# Patient Record
Sex: MALE | Race: OTHER
[De-identification: names, ages, dates, MRNs, and addresses within clinical notes are randomized per-mention and may not be internally consistent; named-entity substitution may affect disease eponyms.]

---

## 2020-07-29 ENCOUNTER — APPOINTMENT (OUTPATIENT)
Dept: OTOLARYNGOLOGY | Facility: CLINIC | Age: 65
End: 2020-07-29

## 2022-09-13 PROBLEM — Z00.00 ENCOUNTER FOR PREVENTIVE HEALTH EXAMINATION: Status: ACTIVE | Noted: 2022-09-13

## 2023-01-04 ENCOUNTER — NON-APPOINTMENT (OUTPATIENT)
Age: 68
End: 2023-01-04

## 2023-01-04 ENCOUNTER — APPOINTMENT (OUTPATIENT)
Dept: ORTHOPEDIC SURGERY | Facility: CLINIC | Age: 68
End: 2023-01-04
Payer: MEDICARE

## 2023-01-04 VITALS
HEART RATE: 64 BPM | BODY MASS INDEX: 28.77 KG/M2 | WEIGHT: 179 LBS | DIASTOLIC BLOOD PRESSURE: 76 MMHG | SYSTOLIC BLOOD PRESSURE: 115 MMHG | HEIGHT: 66 IN

## 2023-01-04 DIAGNOSIS — M79.642 PAIN IN RIGHT HAND: ICD-10-CM

## 2023-01-04 DIAGNOSIS — M79.641 PAIN IN RIGHT HAND: ICD-10-CM

## 2023-01-04 PROCEDURE — 73130 X-RAY EXAM OF HAND: CPT | Mod: 50

## 2023-01-04 PROCEDURE — 73110 X-RAY EXAM OF WRIST: CPT | Mod: 50

## 2023-01-04 PROCEDURE — 99203 OFFICE O/P NEW LOW 30 MIN: CPT

## 2023-01-04 NOTE — PHYSICAL EXAM
[de-identified] : - Constitutional: This is a male in no obvious distress.  \par - Psych: Patient is alert and oriented to person, place and time.  Patient has a normal mood and affect.\par - Cardiovascular: Normal pulses throughout the upper extremities.  No significant varicosities are noted in the upper extremities. \par - Neuro: Strength and sensation are intact throughout the upper extremities.  Patient has normal coordination.\par - Respiratory:  Patient exhibits no evidence of shortness of breath or difficulty breathing.\par - Skin: No rashes, lesions, or other abnormalities are noted in the upper extremities.\par \par --- \par \par Examination of both hands demonstrates swelling and tenderness along the A1 pulleys of the middle fingers.  There is triggering of both digits.  There is no obvious triggering of the other digits.  He has intact sensation to light touch bilaterally along the radial, ulnar and median nerve distributions.  Provocative signs for carpal tunnel syndrome are positive on the left side. [de-identified] : PA, lateral, and oblique radiographs of the bilateral wrists and hands demonstrate minimal degenerative changes at the DIP joints of the digits and at the CMC joints of the thumbs.

## 2023-01-04 NOTE — ADDENDUM
[FreeTextEntry1] : I, Elizabeth Almonte, acted solely as a scribe for Dr. Joy on this date on 01/04/2023.

## 2023-01-04 NOTE — END OF VISIT
[FreeTextEntry3] : This note was written by Elizabeth Almonte on 01/04/2023 acting solely as a scribe for Dr. Brian Joy.\par  \par All medical record entries made by the Scribe were at my, Dr. Brian Joy, direction and personally dictated by me on 01/04/2023. I have personally reviewed the chart and agree that the record accurately reflects my personal performance of the history, physical exam, assessment and plan.

## 2023-01-04 NOTE — DISCUSSION/SUMMARY
[FreeTextEntry1] : He has findings consistent with left carpal tunnel syndrome and bilateral middle finger trigger fingers.\par \par I had a discussion with the patient regarding today's visit, the prognosis of this diagnosis, and treatment recommendations and options. With regard to the left hand numbness and tingling, I recommended an EMG to evaluate for left carpal tunnel syndrome. I also recommended bracing, he was fitted with a left carpal tunnel splint in the office today. He will follow up after his EMG to review the results and discuss treatment recommendations.\par \par With regard to the bilateral middle fingers, I did tell him that he developed trigger fingers. I would first recommend he undergo the EMG for the left hand for further evaluation, prior to making definitive treatment recommendations with regard to the middle fingers.\par \par He has agreed to the above plan of management and has expressed full understanding.  All questions were fully answered to their satisfaction. \par \par My cumulative time spent on this visit included: Preparation for the visit, review of the medical records, review of pertinent diagnostic studies, examination and counseling of the patient on the above diagnosis, treatment plan and prognosis, orders of diagnostic tests, medication and/or appropriate procedures and documentation in the medical records of today's visit.

## 2023-01-04 NOTE — HISTORY OF PRESENT ILLNESS
[Right] : right hand dominant [FreeTextEntry1] : He comes in today for evaluation of bilateral hand pain which began 3 months ago. He notes the left hand is more symptomatic than the right. He additionally complains of numbness and tingling most notably at the left hand. He notes the hand "falls asleep", typically when the elbow is in a flexed position. He has a difficult time extending the digits of the left hand due to stiffness. He also notes pain and tenderness bilaterally at the middle fingers. He has not had prior treatment for his hands.

## 2023-01-23 ENCOUNTER — APPOINTMENT (OUTPATIENT)
Dept: NEUROLOGY | Facility: CLINIC | Age: 68
End: 2023-01-23

## 2023-02-08 ENCOUNTER — APPOINTMENT (OUTPATIENT)
Dept: ORTHOPEDIC SURGERY | Facility: CLINIC | Age: 68
End: 2023-02-08
Payer: MEDICARE

## 2023-02-08 PROCEDURE — 99214 OFFICE O/P EST MOD 30 MIN: CPT

## 2023-02-08 NOTE — DISCUSSION/SUMMARY
[FreeTextEntry1] : I reviewed the EMG results with him.  I had a discussion regarding today's visit, the diagnosis and treatment recommendations and options.  We also discussed changes since the last visit.\par \par With regard to the bilateral middle fingers, he deferred cortisone injections as he stated his symptoms are mild today. He will follow up as needed, according to his symptoms.\par \par With regard to the left greater than right carpal tunnel syndrome, we discussed treatment recommendations.  With regard to the left side, we discussed surgical release which she deferred at this time.  He would like to give this further time, if his symptoms are worsening, then he will return to the office to discuss further treatment recommendations.\par \par The patient has agreed to the above plan of management and has expressed full understanding.  All questions were fully answered to the patient's satisfaction.\par \par My cumulative time spent on today's visit was greater than 30 minutes and included: Preparation for the visit, review of the medical records, review of pertinent diagnostic studies, examination and counseling of the patient on the above diagnosis, treatment plan and prognosis, orders of diagnostic tests, medications and/or appropriate procedures and documentation in the medical records of today's visit.

## 2023-02-08 NOTE — HISTORY OF PRESENT ILLNESS
[FreeTextEntry1] : Follow-up regarding left carpal tunnel syndrome bilateral middle finger trigger fingers.\par \par See note from when he was seen in the office 5 weeks ago.  He was referred for EMGs.  He comes in to review results and discuss treatment recommendations.\par \par He returns today and reports continued symptoms at the bilateral middle fingers. He additionally complains of numbness and tingling to the hands bilaterally. He has worn braces at night without relief. \par \par I reviewed EMGs dated 01/30/2023. These demonstrated evidence of mild right and moderate left median nerve neuropathy at the wrist, consistent with carpal tunnel syndrome.

## 2023-02-08 NOTE — PHYSICAL EXAM
[de-identified] : - Constitutional: This is a male in no obvious distress.  \par - Psych: Patient is alert and oriented to person, place and time.  Patient has a normal mood and affect.\par - Cardiovascular: Normal pulses throughout the upper extremities.  No significant varicosities are noted in the upper extremities. \par - Neuro: Strength and sensation are intact throughout the upper extremities.  Patient has normal coordination.\par - Respiratory:  Patient exhibits no evidence of shortness of breath or difficulty breathing.\par - Skin: No rashes, lesions, or other abnormalities are noted in the upper extremities.\par \par --- \par \par Examination of both hands demonstrates swelling and tenderness along the A1 pulleys of the middle fingers.  There is triggering of both digits.  There is no obvious triggering of the other digits.  He has intact sensation to light touch bilaterally along the radial, ulnar and median nerve distributions.  Provocative signs for carpal tunnel syndrome are positive on the left side. [de-identified] : Recent PA, lateral, and oblique radiographs of the bilateral wrists and hands demonstrated minimal degenerative changes at the DIP joints of the digits and at the CMC joints of the thumbs.

## 2023-02-08 NOTE — END OF VISIT
[FreeTextEntry3] : This note was written by Elizabeth Almonte on 02/08/2023 acting solely as a scribe for Dr. Brian Joy.\par  \par All medical record entries made by the Scribe were at my, Dr. Brian Joy, direction and personally dictated by me on 02/08/2023. I have personally reviewed the chart and agree that the record accurately reflects my personal performance of the history, physical exam, assessment and plan.

## 2023-02-08 NOTE — ADDENDUM
[FreeTextEntry1] : I, Elizabeth Almonte, acted solely as a scribe for Dr. Joy on this date on 02/08/2023.

## 2023-04-28 ENCOUNTER — APPOINTMENT (OUTPATIENT)
Dept: ORTHOPEDIC SURGERY | Facility: CLINIC | Age: 68
End: 2023-04-28
Payer: MEDICARE

## 2023-04-28 PROCEDURE — 99214 OFFICE O/P EST MOD 30 MIN: CPT

## 2023-04-28 NOTE — END OF VISIT
[FreeTextEntry3] : This note was written by Elizabeth Almonte on 04/28/2023 acting solely as a scribe for Dr. Brian Joy.\par  \par All medical record entries made by the Scribe were at my, Dr. Brian Joy, direction and personally dictated by me on 04/28/2023. I have personally reviewed the chart and agree that the record accurately reflects my personal performance of the history, physical exam, assessment and plan.

## 2023-04-28 NOTE — PHYSICAL EXAM
[de-identified] : - Constitutional: This is a male in no obvious distress.  \par - Psych: Patient is alert and oriented to person, place and time.  Patient has a normal mood and affect.\par - Cardiovascular: Normal pulses throughout the upper extremities.  No significant varicosities are noted in the upper extremities. \par - Neuro: Strength and sensation are intact throughout the upper extremities.  Patient has normal coordination.\par - Respiratory:  Patient exhibits no evidence of shortness of breath or difficulty breathing.\par - Skin: No rashes, lesions, or other abnormalities are noted in the upper extremities.\par \par --- \par \par Examination of his left hand demonstrates tenderness along the A1 pulleys of the index and middle fingers.  There is mild triggering.  There is no triggering of the other digits.  He has complaints of numbness and tingling along the median nerve distribution with normal sensation to light touch along the radial and ulnar nerve distributions.  Provocative signs for carpal tunnel syndrome are positive.\par \par Lamination of his right hand demonstrates no obvious swelling or tenderness along the A1 pulley of the middle finger.  There is minimal triggering.  Provocative signs for carpal tunnel syndrome are negative. [de-identified] : Recent PA, lateral, and oblique radiographs of the bilateral wrists and hands demonstrated minimal degenerative changes at the DIP joints of the digits and at the CMC joints of the thumbs.

## 2023-04-28 NOTE — ADDENDUM
[FreeTextEntry1] : I, Elizabeth Almonte, acted solely as a scribe for Dr. Joy on this date on 04/28/2023.

## 2023-04-28 NOTE — HISTORY OF PRESENT ILLNESS
[FreeTextEntry1] : Follow-up regarding left carpal tunnel syndrome and bilateral middle finger trigger fingers.\par \par See note from when he was last seen in the office greater than 2 1/2 months ago.  He deferred injections.\par \par He returns today and notes triggering at the left index and middle fingers. He notes pain and locking of both digits. He additionally complains of numbness to the left hand. He experiencing throbbing pain and numbness to the left hand, worse at night. He notes when the hand goes numb, the left index and middle trigger fingers are exacerbated. He additionally notes continued pain at the right middle finger, although it is overall improved and is not as severe as the trigger fingers to the left hand. \par \par EMGs dated 01/30/2023 demonstrated evidence of mild right and moderate left median nerve neuropathy at the wrist, consistent with carpal tunnel syndrome.

## 2023-04-28 NOTE — DISCUSSION/SUMMARY
[FreeTextEntry1] : I had a discussion regarding today's visit, the diagnosis and treatment recommendations and options.  We also discussed changes since the last visit. With regard to the right middle finger trigger finger, as his symptoms are mild,t, I recommended observation. He will follow up as needed in this regard.\par \par With regard to his left carpal tunnel syndrome as well as his left index and middle finger trigger fingers, he deferred cortisone injection.  He would like to schedule surgery.  He will be scheduled for an endoscopic left carpal tunnel release and a left index and middle finger trigger release.  He will speak with our surgical scheduler and be scheduled for surgery in the near future, when it is convenient for him.\par \par -  The nature and purposes of endoscopic carpal tunnel release, in addition to left index and middle finger trigger releases, was discussed in detail with the patient. We discussed the surgical procedure in detail, as well as expected postoperative recovery and outcome.\par -  Possible risks, benefits and complications (from known and unknown causes) of the procedure were discussed in detail.  \par -  Possible non-operative alternatives to the proposed treatment were discussed in detail.  \par -  I discussed with the patient that I will be performing an endoscopic carpal tunnel release, in addition to left index and middle finger trigger releases. We discussed both open and endoscopic carpal tunnel release, and the associated risks and benefits of each of these procedures. The patient understands that it is possible that the endoscopic carpal tunnel release may need to be converted to an open release during the procedure, if the carpal tunnel cannot be well visualized or safely released endoscopically. In addition, the patient does understand that, based upon some of the hand surgery literature, there is a slightly higher risk of iatrogenic nerve or vessel injury associated with endoscopic carpal tunnel release, as compared to open carpal tunnel release.\par -  He was also told that other possible risks/complications include, but are not limited to:  Infection, nerve or vessel injury, stiffness, painful scar, poor outcome, need for additional surgical procedures, and other unforeseen complications.  \par -  In addition, the possibility of an "unsuccessful outcome," despite "successful surgery," was discussed with the patient.  The patient understands that nerve recovery after surgical release can be unpredictable, and there are no ""guarantees"" that the preoperative symptoms will completely resolve.\par -  The patient fully understands these risks and wishes to proceed.  \par -  I had a lengthy discussion with the patient regarding today's visit, the diagnosis, and my surgical treatment recommendations.  The patient has agreed to this plan of management and has expressed full understanding.  All questions were fully answered to the patient's satisfaction. 	 \par \par My cumulative time spent on today's visit was greater than 30 minutes and included: Preparation for the visit, review of the medical records, review of pertinent diagnostic studies, examination and counseling of the patient on the above diagnosis, treatment plan and prognosis, orders of diagnostic tests, medications and/or appropriate procedures and documentation in the medical records of today's visit.

## 2023-05-17 ENCOUNTER — OUTPATIENT (OUTPATIENT)
Dept: OUTPATIENT SERVICES | Facility: HOSPITAL | Age: 68
LOS: 1 days | End: 2023-05-17
Payer: MEDICARE

## 2023-05-17 VITALS
RESPIRATION RATE: 14 BRPM | SYSTOLIC BLOOD PRESSURE: 117 MMHG | DIASTOLIC BLOOD PRESSURE: 71 MMHG | OXYGEN SATURATION: 96 % | TEMPERATURE: 97 F | WEIGHT: 182.98 LBS | HEART RATE: 65 BPM | HEIGHT: 66 IN

## 2023-05-17 DIAGNOSIS — G56.02 CARPAL TUNNEL SYNDROME, LEFT UPPER LIMB: ICD-10-CM

## 2023-05-17 DIAGNOSIS — M65.322 TRIGGER FINGER, LEFT INDEX FINGER: ICD-10-CM

## 2023-05-17 DIAGNOSIS — Z01.818 ENCOUNTER FOR OTHER PREPROCEDURAL EXAMINATION: ICD-10-CM

## 2023-05-17 DIAGNOSIS — Z98.890 OTHER SPECIFIED POSTPROCEDURAL STATES: Chronic | ICD-10-CM

## 2023-05-17 DIAGNOSIS — M65.332 TRIGGER FINGER, LEFT MIDDLE FINGER: ICD-10-CM

## 2023-05-17 LAB
ALBUMIN SERPL ELPH-MCNC: 3.9 G/DL — SIGNIFICANT CHANGE UP (ref 3.3–5)
ALP SERPL-CCNC: 72 U/L — SIGNIFICANT CHANGE UP (ref 30–120)
ALT FLD-CCNC: 34 U/L DA — SIGNIFICANT CHANGE UP (ref 10–60)
ANION GAP SERPL CALC-SCNC: 9 MMOL/L — SIGNIFICANT CHANGE UP (ref 5–17)
AST SERPL-CCNC: 20 U/L — SIGNIFICANT CHANGE UP (ref 10–40)
BILIRUB SERPL-MCNC: 0.4 MG/DL — SIGNIFICANT CHANGE UP (ref 0.2–1.2)
BUN SERPL-MCNC: 25 MG/DL — HIGH (ref 7–23)
CALCIUM SERPL-MCNC: 9.7 MG/DL — SIGNIFICANT CHANGE UP (ref 8.4–10.5)
CHLORIDE SERPL-SCNC: 104 MMOL/L — SIGNIFICANT CHANGE UP (ref 96–108)
CO2 SERPL-SCNC: 28 MMOL/L — SIGNIFICANT CHANGE UP (ref 22–31)
CREAT SERPL-MCNC: 1.1 MG/DL — SIGNIFICANT CHANGE UP (ref 0.5–1.3)
EGFR: 73 ML/MIN/1.73M2 — SIGNIFICANT CHANGE UP
GLUCOSE SERPL-MCNC: 96 MG/DL — SIGNIFICANT CHANGE UP (ref 70–99)
HCT VFR BLD CALC: 41.4 % — SIGNIFICANT CHANGE UP (ref 39–50)
HGB BLD-MCNC: 14.1 G/DL — SIGNIFICANT CHANGE UP (ref 13–17)
MCHC RBC-ENTMCNC: 28.8 PG — SIGNIFICANT CHANGE UP (ref 27–34)
MCHC RBC-ENTMCNC: 34.1 GM/DL — SIGNIFICANT CHANGE UP (ref 32–36)
MCV RBC AUTO: 84.5 FL — SIGNIFICANT CHANGE UP (ref 80–100)
NRBC # BLD: 0 /100 WBCS — SIGNIFICANT CHANGE UP (ref 0–0)
PLATELET # BLD AUTO: 177 K/UL — SIGNIFICANT CHANGE UP (ref 150–400)
POTASSIUM SERPL-MCNC: 4.3 MMOL/L — SIGNIFICANT CHANGE UP (ref 3.5–5.3)
POTASSIUM SERPL-SCNC: 4.3 MMOL/L — SIGNIFICANT CHANGE UP (ref 3.5–5.3)
PROT SERPL-MCNC: 7.4 G/DL — SIGNIFICANT CHANGE UP (ref 6–8.3)
RBC # BLD: 4.9 M/UL — SIGNIFICANT CHANGE UP (ref 4.2–5.8)
RBC # FLD: 13.2 % — SIGNIFICANT CHANGE UP (ref 10.3–14.5)
SODIUM SERPL-SCNC: 141 MMOL/L — SIGNIFICANT CHANGE UP (ref 135–145)
WBC # BLD: 6.25 K/UL — SIGNIFICANT CHANGE UP (ref 3.8–10.5)
WBC # FLD AUTO: 6.25 K/UL — SIGNIFICANT CHANGE UP (ref 3.8–10.5)

## 2023-05-17 PROCEDURE — 36415 COLL VENOUS BLD VENIPUNCTURE: CPT

## 2023-05-17 PROCEDURE — 85027 COMPLETE CBC AUTOMATED: CPT

## 2023-05-17 PROCEDURE — 93010 ELECTROCARDIOGRAM REPORT: CPT

## 2023-05-17 PROCEDURE — 80053 COMPREHEN METABOLIC PANEL: CPT

## 2023-05-17 PROCEDURE — G0463: CPT

## 2023-05-17 PROCEDURE — 93005 ELECTROCARDIOGRAM TRACING: CPT

## 2023-05-17 NOTE — H&P PST ADULT - HISTORY OF PRESENT ILLNESS
this is a 68 y/o male who has had numbness tingling, pain left hand for yrs; to have carpal tunnel release left hand

## 2023-05-17 NOTE — H&P PST ADULT - NSANTHOSAYNRD_GEN_A_CORE
left ankle injury while playing basketball No. RAH screening performed.  STOP BANG Legend: 0-2 = LOW Risk; 3-4 = INTERMEDIATE Risk; 5-8 = HIGH Risk

## 2023-05-17 NOTE — H&P PST ADULT - PROBLEM SELECTOR PLAN 1
endoscopic left carpal tunnel release, possible open, left index finger trigger release, left middle finger trigger release, preop instructions given, to go for medical clearance

## 2023-05-26 ENCOUNTER — NON-APPOINTMENT (OUTPATIENT)
Age: 68
End: 2023-05-26

## 2023-06-05 ENCOUNTER — TRANSCRIPTION ENCOUNTER (OUTPATIENT)
Age: 68
End: 2023-06-05

## 2023-06-06 ENCOUNTER — APPOINTMENT (OUTPATIENT)
Dept: ORTHOPEDIC SURGERY | Facility: HOSPITAL | Age: 68
End: 2023-06-06

## 2023-06-06 ENCOUNTER — TRANSCRIPTION ENCOUNTER (OUTPATIENT)
Age: 68
End: 2023-06-06

## 2023-06-06 ENCOUNTER — OUTPATIENT (OUTPATIENT)
Dept: OUTPATIENT SERVICES | Facility: HOSPITAL | Age: 68
LOS: 1 days | End: 2023-06-06
Payer: MEDICARE

## 2023-06-06 VITALS
HEART RATE: 66 BPM | DIASTOLIC BLOOD PRESSURE: 73 MMHG | WEIGHT: 176.59 LBS | RESPIRATION RATE: 14 BRPM | SYSTOLIC BLOOD PRESSURE: 117 MMHG | OXYGEN SATURATION: 100 % | HEIGHT: 66 IN | TEMPERATURE: 98 F

## 2023-06-06 VITALS
SYSTOLIC BLOOD PRESSURE: 111 MMHG | OXYGEN SATURATION: 97 % | RESPIRATION RATE: 15 BRPM | DIASTOLIC BLOOD PRESSURE: 59 MMHG | HEART RATE: 66 BPM | TEMPERATURE: 98 F

## 2023-06-06 DIAGNOSIS — M65.322 TRIGGER FINGER, LEFT INDEX FINGER: ICD-10-CM

## 2023-06-06 DIAGNOSIS — Z98.890 OTHER SPECIFIED POSTPROCEDURAL STATES: Chronic | ICD-10-CM

## 2023-06-06 DIAGNOSIS — M65.332 TRIGGER FINGER, LEFT MIDDLE FINGER: ICD-10-CM

## 2023-06-06 DIAGNOSIS — G56.02 CARPAL TUNNEL SYNDROME, LEFT UPPER LIMB: ICD-10-CM

## 2023-06-06 PROCEDURE — 29848 WRIST ENDOSCOPY/SURGERY: CPT | Mod: LT

## 2023-06-06 PROCEDURE — 26055 INCISE FINGER TENDON SHEATH: CPT | Mod: F1

## 2023-06-06 PROCEDURE — 26055 INCISE FINGER TENDON SHEATH: CPT | Mod: F2

## 2023-06-06 RX ORDER — IBUPROFEN 200 MG
1 TABLET ORAL
Qty: 10 | Refills: 0
Start: 2023-06-06

## 2023-06-06 RX ORDER — OMEPRAZOLE 10 MG/1
1 CAPSULE, DELAYED RELEASE ORAL
Refills: 0 | DISCHARGE

## 2023-06-06 RX ORDER — SODIUM CHLORIDE 9 MG/ML
1000 INJECTION, SOLUTION INTRAVENOUS
Refills: 0 | Status: DISCONTINUED | OUTPATIENT
Start: 2023-06-06 | End: 2023-06-07

## 2023-06-06 RX ORDER — ONDANSETRON 8 MG/1
4 TABLET, FILM COATED ORAL ONCE
Refills: 0 | Status: DISCONTINUED | OUTPATIENT
Start: 2023-06-06 | End: 2023-06-07

## 2023-06-06 RX ORDER — OMEPRAZOLE 10 MG/1
20 CAPSULE, DELAYED RELEASE ORAL
Refills: 0 | DISCHARGE

## 2023-06-06 RX ORDER — CHLORHEXIDINE GLUCONATE 213 G/1000ML
1 SOLUTION TOPICAL ONCE
Refills: 0 | Status: COMPLETED | OUTPATIENT
Start: 2023-06-06 | End: 2023-06-06

## 2023-06-06 RX ORDER — ACETAMINOPHEN 500 MG
1000 TABLET ORAL ONCE
Refills: 0 | Status: COMPLETED | OUTPATIENT
Start: 2023-06-06 | End: 2023-06-06

## 2023-06-06 RX ORDER — HYDROMORPHONE HYDROCHLORIDE 2 MG/ML
0.5 INJECTION INTRAMUSCULAR; INTRAVENOUS; SUBCUTANEOUS
Refills: 0 | Status: DISCONTINUED | OUTPATIENT
Start: 2023-06-06 | End: 2023-06-07

## 2023-06-06 RX ORDER — PITAVASTATIN CALCIUM 1.04 MG/1
1 TABLET, FILM COATED ORAL
Refills: 0 | DISCHARGE

## 2023-06-06 RX ORDER — OXYCODONE HYDROCHLORIDE 5 MG/1
5 TABLET ORAL ONCE
Refills: 0 | Status: DISCONTINUED | OUTPATIENT
Start: 2023-06-06 | End: 2023-06-07

## 2023-06-06 RX ORDER — CEFAZOLIN SODIUM 1 G
2000 VIAL (EA) INJECTION ONCE
Refills: 0 | Status: COMPLETED | OUTPATIENT
Start: 2023-06-06 | End: 2023-06-06

## 2023-06-06 RX ADMIN — SODIUM CHLORIDE 75 MILLILITER(S): 9 INJECTION, SOLUTION INTRAVENOUS at 13:17

## 2023-06-06 RX ADMIN — CHLORHEXIDINE GLUCONATE 1 APPLICATION(S): 213 SOLUTION TOPICAL at 11:42

## 2023-06-06 NOTE — ASU DISCHARGE PLAN (ADULT/PEDIATRIC) - CARE PROVIDER_API CALL
Brian Joy)  Orthopaedic Surgery; Surgery of the Hand  [address], [address]  [place] [address]  Phone: [phone]  Fax: [phone]  Follow Up Time:

## 2023-06-06 NOTE — BRIEF OPERATIVE NOTE - NSICDXBRIEFPROCEDURE_GEN_ALL_CORE_FT
PROCEDURES:  Endoscopic carpal tunnel release 06-Jun-2023 11:54:39  Jose An  Trigger finger release 06-Jun-2023 11:54:50  Jose An

## 2023-06-06 NOTE — ASU PATIENT PROFILE, ADULT - FALL HARM RISK - UNIVERSAL INTERVENTIONS
Bed in lowest position, wheels locked, appropriate side rails in place/Call bell, personal items and telephone in reach/Instruct patient to call for assistance before getting out of bed or chair/Non-slip footwear when patient is out of bed/[place] to call system/Physically safe environment - no spills, clutter or unnecessary equipment/Purposeful Proactive Rounding/Room/bathroom lighting operational, light cord in reach

## 2023-06-06 NOTE — ASU DISCHARGE PLAN (ADULT/PEDIATRIC) - ASU DC SPECIAL INSTRUCTIONSFT
DO NOT wet or remove the dressing.  Elevate the extremity to reduce swelling.  No strenuous activities or heavy lifting.    Please follow Dr. Joy's instructions.    Follow up in the office on 6/16/23.  Please call to confirm the appointment.

## 2023-06-06 NOTE — ASU DISCHARGE PLAN (ADULT/PEDIATRIC) - NS MD DC FALL RISK RISK
For information on Fall & Injury Prevention, visit: [email or web address] OR  [email or web address] OR  https://www.cdc.gov/steadi/patient.html

## 2023-06-06 NOTE — BRIEF OPERATIVE NOTE - NSICDXBRIEFPOSTOP_GEN_ALL_CORE_FT
POST-OP DIAGNOSIS:  Trigger finger, left middle finger 06-Jun-2023 11:56:09  Jose An  Trigger finger, left index finger 06-Jun-2023 11:56:04  Jose An  Left carpal tunnel syndrome 06-Jun-2023 11:55:57  Jose An

## 2023-06-06 NOTE — BRIEF OPERATIVE NOTE - NSICDXBRIEFPREOP_GEN_ALL_CORE_FT
PRE-OP DIAGNOSIS:  Left carpal tunnel syndrome 06-Jun-2023 11:55:11  Jose An  Trigger finger, left index finger 06-Jun-2023 11:55:31  Jose An  Trigger finger, left middle finger 06-Jun-2023 11:55:43  Jose An

## 2023-06-06 NOTE — ASU DISCHARGE PLAN (ADULT/PEDIATRIC) - PROCEDURE
Left endoscopic carpal tunnel release, left index finger trigger release and left middle finger trigger release.

## 2023-06-07 PROBLEM — E78.5 HYPERLIPIDEMIA, UNSPECIFIED: Chronic | Status: ACTIVE | Noted: 2023-05-17

## 2023-06-14 ENCOUNTER — APPOINTMENT (OUTPATIENT)
Dept: ORTHOPEDIC SURGERY | Facility: CLINIC | Age: 68
End: 2023-06-14
Payer: MEDICARE

## 2023-06-14 PROBLEM — N20.0 CALCULUS OF KIDNEY: Chronic | Status: ACTIVE | Noted: 2023-05-17

## 2023-06-14 PROBLEM — Z86.69 PERSONAL HISTORY OF OTHER DISEASES OF THE NERVOUS SYSTEM AND SENSE ORGANS: Chronic | Status: ACTIVE | Noted: 2023-05-17

## 2023-06-14 PROCEDURE — 99024 POSTOP FOLLOW-UP VISIT: CPT

## 2023-06-14 RX ORDER — CEPHALEXIN 500 MG/1
500 TABLET ORAL
Qty: 8 | Refills: 0 | Status: ACTIVE | COMMUNITY
Start: 2023-06-14 | End: 1900-01-01

## 2023-06-14 NOTE — END OF VISIT
[FreeTextEntry3] : This note was written by Elizabeth Almonte on 06/14/2023 acting solely as a scribe for Dr. Brian Joy.\par  \par All medical record entries made by the Scribe were at my, Dr. Brian Joy, direction and personally dictated by me on 06/14/2023. I have personally reviewed the chart and agree that the record accurately reflects my personal performance of the history, physical exam, assessment and plan.

## 2023-06-14 NOTE — ADDENDUM
[FreeTextEntry1] : I, Elizabeth Almonte, acted solely as a scribe for Dr. Joy on this date on 06/14/2023.

## 2023-06-14 NOTE — HISTORY OF PRESENT ILLNESS
[de-identified] : 8 days postoperative. [de-identified] : 8 days status post endoscopic left carpal tunnel release and left index and middle finger trigger releases.  Date of surgery: 6/6/2023.\par \par He doing okay but notes residual numbness as well as pain postoperatively. He also complains of weakness to the hand. He rates his pain as an 8 out of 10 at this time.  [de-identified] : Examination of his left wrist and hand demonstrates his release incisions to be healing well.  There is slight erythema without drainage at the carpal tunnel release incision.  He has appropriate flexion and extension of the digits without residual triggering.  Is intact sensation to light touch distally along the radial, ulnar and median nerve distributions. [de-identified] : Stable, 8 days postoperative, with slight erythema along the carpal tunnel release incision. [de-identified] : He was told that the sutures will fall out on their own.  He was instructed on local wound care, when to begin scar massage and desensitization, range of motion exercises. Finally, given the mild redness surrounding the incision sites, I recommended he begin a 2-day course of Keflex 500 mg, QID with meals.  He will followup in 2 weeks.  However, if he notices increased pain, swelling redness or signs of infection at the carpal tunnel incision, then he will return to the office before then.

## 2023-06-16 ENCOUNTER — NON-APPOINTMENT (OUTPATIENT)
Age: 68
End: 2023-06-16

## 2023-06-19 ENCOUNTER — NON-APPOINTMENT (OUTPATIENT)
Age: 68
End: 2023-06-19

## 2023-06-19 DIAGNOSIS — Z98.890 OTHER SPECIFIED POSTPROCEDURAL STATES: ICD-10-CM

## 2023-06-19 RX ORDER — CEPHALEXIN 500 MG/1
500 CAPSULE ORAL EVERY 8 HOURS
Qty: 15 | Refills: 0 | Status: ACTIVE | COMMUNITY
Start: 2023-06-19 | End: 1900-01-01

## 2023-06-21 ENCOUNTER — APPOINTMENT (OUTPATIENT)
Dept: ORTHOPEDIC SURGERY | Facility: CLINIC | Age: 68
End: 2023-06-21
Payer: MEDICARE

## 2023-06-21 PROCEDURE — 99024 POSTOP FOLLOW-UP VISIT: CPT

## 2023-06-21 NOTE — HISTORY OF PRESENT ILLNESS
[de-identified] : 15 days postoperative. [de-identified] : 15 days status post endoscopic left carpal tunnel release and left index and middle finger trigger releases.  Date of surgery: 6/6/2023.\par \par He called the office 2 days ago as he had some redness and drainage at the middle finger incision.  He was started on Keflex 500 mg, TID. He notes evidence of mild drainage such as pus from the index finger incision site however today there is no drainage. He complains of continued pain and stiffness to the digits but notes improvements in his preoperative symptoms of numbness and tingling. He rates his pain as a 6 out of 10 at this time.  [de-identified] : Examination of his left wrist and hand demonstrates his release incisions to be healing well.  There is no obvious erythema, pus or signs of an active infection.  There is residual swelling, most notably at the index finger incision.  He has improved flexion and extension of the digits without residual triggering.  Is intact sensation to light touch distally along the radial, ulnar and median nerve distributions. [de-identified] : 15 days postoperative, with evidence of a resolved superficial infection at his right middle finger trigger release incision. [de-identified] : At this time, he will continue with the course of Keflex 500 mg, TID, as prescribed through Friday. He was instructed on continued range of motion exercises and when to begin scar massage and desensitization.  He will follow-up in 4 weeks.  He was instructed to return sooner if he notices any increased signs of symptoms of infection such as redness, drainage, fever or chills.

## 2023-06-21 NOTE — ADDENDUM
[FreeTextEntry1] : I, Elizabeth Almonte, acted solely as a scribe for Dr. Joy on this date on 06/21/2023.

## 2023-06-21 NOTE — END OF VISIT
[FreeTextEntry3] : This note was written by Elizabeth Almonte on 06/21/2023 acting solely as a scribe for Dr. Brian Joy.\par  \par All medical record entries made by the Scribe were at my, Dr. Brian Joy, direction and personally dictated by me on 06/21/2023. I have personally reviewed the chart and agree that the record accurately reflects my personal performance of the history, physical exam, assessment and plan.

## 2023-07-12 ENCOUNTER — NON-APPOINTMENT (OUTPATIENT)
Age: 68
End: 2023-07-12

## 2023-07-14 ENCOUNTER — APPOINTMENT (OUTPATIENT)
Dept: ORTHOPEDIC SURGERY | Facility: CLINIC | Age: 68
End: 2023-07-14
Payer: MEDICARE

## 2023-07-14 ENCOUNTER — NON-APPOINTMENT (OUTPATIENT)
Age: 68
End: 2023-07-14

## 2023-07-14 PROCEDURE — 99024 POSTOP FOLLOW-UP VISIT: CPT

## 2023-07-14 RX ORDER — MELOXICAM 15 MG/1
15 TABLET ORAL DAILY
Qty: 30 | Refills: 1 | Status: ACTIVE | COMMUNITY
Start: 2023-07-14 | End: 1900-01-01

## 2023-07-14 NOTE — ADDENDUM
[FreeTextEntry1] : I, Elizabeth Almonte, acted solely as a scribe for Dr. Joy on this date on 07/14/2023.

## 2023-07-14 NOTE — END OF VISIT
[FreeTextEntry3] : This note was written by Elizabeth Almonte on 07/14/2023 acting solely as a scribe for Dr. Brian Joy.\par  \par All medical record entries made by the Scribe were at my, Dr. Brian Joy, direction and personally dictated by me on 07/14/2023. I have personally reviewed the chart and agree that the record accurately reflects my personal performance of the history, physical exam, assessment and plan.

## 2023-07-14 NOTE — HISTORY OF PRESENT ILLNESS
[de-identified] : 38 days postoperative. [de-identified] : 38 days status post endoscopic left carpal tunnel release and left index and middle finger trigger releases.  Date of surgery: 6/6/2023.\par \par He was last seen in the office 23 days ago and he had evidence of a resolved superficial infection at the right middle finger trigger release incision.\par \par He returns today and notes increased pain as well as swelling. He has difficulty making a tight composite fist. He also notes residual tingling at the left index finger, however this is somewhat improved as compared to preoperatively.  [de-identified] : Examination of his left wrist and hand demonstrates his release incisions to be healing well.  There is no erythema or signs of an infection.  There is some residual swelling most notably at the middle finger incision.  He has some limitation of terminal flexion and extension, more notably at the middle finger with no residual triggering.  He has intact sensation to light touch throughout the fingers although he does have some complaints of some residual numbness which she states is improved compared to preoperative. [de-identified] : 38 days postoperative, with residual swelling and stiffness and pain. [de-identified] : At this time, I did tell him that I see no signs of an infection.  At this time I recommended he begin a course of Mobic 15 mg, once daily with meals for the next 2 weeks. I warned of potential GI side effects. Finally, I recommended he begin a course of hand therapy and he was provided with the appropriate referral. He will follow up in 4 weeks. If at that time, he has continued complaints of pain and swelling, I may possibly recommend a cortisone injection in the region of the left middle finger incision site to break up to the scar tissue.

## 2023-07-26 ENCOUNTER — APPOINTMENT (OUTPATIENT)
Dept: ORTHOPEDIC SURGERY | Facility: CLINIC | Age: 68
End: 2023-07-26

## 2023-08-03 ENCOUNTER — NON-APPOINTMENT (OUTPATIENT)
Age: 68
End: 2023-08-03

## 2023-08-11 ENCOUNTER — APPOINTMENT (OUTPATIENT)
Dept: ORTHOPEDIC SURGERY | Facility: CLINIC | Age: 68
End: 2023-08-11
Payer: MEDICARE

## 2023-08-11 PROCEDURE — 99024 POSTOP FOLLOW-UP VISIT: CPT

## 2023-08-11 RX ORDER — METHYLPREDNISOLONE 4 MG/1
4 TABLET ORAL
Qty: 21 | Refills: 0 | Status: ACTIVE | COMMUNITY
Start: 2023-08-11 | End: 1900-01-01

## 2023-08-11 NOTE — END OF VISIT
[FreeTextEntry3] : This note was written by Elizabeth Almonte on 08/11/2023 acting solely as a scribe for Dr. Brian Joy.   All medical record entries made by the Scribe were at my, Dr. Brian Joy, direction and personally dictated by me on 08/11/2023. I have personally reviewed the chart and agree that the record accurately reflects my personal performance of the history, physical exam, assessment and plan.

## 2023-08-11 NOTE — ADDENDUM
[FreeTextEntry1] : I, Elizabeth Almonte, acted solely as a scribe for Dr. Joy on this date on 08/11/2023.

## 2023-08-11 NOTE — HISTORY OF PRESENT ILLNESS
[de-identified] : Greater than 2 months postoperative. [de-identified] : Greater than 2 months status post endoscopic left carpal tunnel release and left index and middle finger trigger releases.  Date of surgery: 6/6/2023.  He is not in hand therapy.  He is having gross pain, which he rates as a 10 out of 10. He is taking Motrin without relief. He notes residual swelling and is not going to OT, as he states it is too painful. [de-identified] : Examination of his left wrist and hand demonstrates his release incisions to be well-healed.  There is decreased swelling and he has improved flexion and extension of the digits.  He has some residual numbness along the median nerve distribution most notably at the middle finger, which he states is improved.  There is no residual triggering. [de-identified] : Greater than 2 months postoperative, with residual complaints but objective evidence of clinical improvement. [de-identified] : At this time, given his persistent symptoms, he was instructed to discontinue taking oral anti-inflammatories and I prescribed a Medrol Dosepak. I warned of potential side effects. He will follow up in 2 weeks for reevaluation.

## 2023-08-16 ENCOUNTER — NON-APPOINTMENT (OUTPATIENT)
Age: 68
End: 2023-08-16

## 2023-08-25 ENCOUNTER — APPOINTMENT (OUTPATIENT)
Dept: ORTHOPEDIC SURGERY | Facility: CLINIC | Age: 68
End: 2023-08-25
Payer: MEDICARE

## 2023-08-25 PROCEDURE — 99024 POSTOP FOLLOW-UP VISIT: CPT

## 2023-08-25 NOTE — HISTORY OF PRESENT ILLNESS
[5] : the patient reports pain that is 5/10 in severity [Chills] : no chills [Fever] : no fever [de-identified] : 2 1/2 months postoperative. [de-identified] : 2 1/2 months status post endoscopic left carpal tunnel release and left index and middle finger trigger releases.  Date of surgery: 6/6/2023.  See note from when he was seen in the office 2 weeks ago.  He was prescribed a Medrol Dosepak.  He has seen improvement to his symptoms with the Medrol Dose Pack however he continues to have discomfort. He states that he as symptoms of numbness & stiffness at his time. The patient states that the pain has been constant and has no resided. The patient notes his swelling has improved. [de-identified] : Examination of his left wrist and hand demonstrates his release incisions to be well-healed.  There is decreased swelling and he has improved flexion and extension of the digits.  He has some residual numbness along the median nerve distribution most notably at the middle finger, which he states is improved.  There is no residual triggering. [de-identified] : 2 1/2 months postoperative,  Clinically improved with decreased swelling, with some residual symptoms. [de-identified] : At this time, I recommended close observation to the patient. I informed the patient that he seems to be making slow progress at this time and should continue to see progress with more time. The patient should follow-up with me in 6 weeks for further assessment of his symptoms. If the patient is not feeling improvement to his symptoms on his return, we will further discuss injections & treatment options.

## 2023-10-05 ENCOUNTER — NON-APPOINTMENT (OUTPATIENT)
Age: 68
End: 2023-10-05

## 2023-10-06 PROBLEM — M65.322 TRIGGER INDEX FINGER OF LEFT HAND: Status: ACTIVE | Noted: 2023-04-28

## 2023-10-06 PROBLEM — M65.331 TRIGGER MIDDLE FINGER OF RIGHT HAND: Status: ACTIVE | Noted: 2023-01-04

## 2023-10-06 PROBLEM — G56.02 CARPAL TUNNEL SYNDROME OF LEFT WRIST: Status: ACTIVE | Noted: 2023-01-04

## 2023-10-06 PROBLEM — M65.332 TRIGGER MIDDLE FINGER OF LEFT HAND: Status: ACTIVE | Noted: 2023-01-04

## 2023-10-13 ENCOUNTER — APPOINTMENT (OUTPATIENT)
Dept: ORTHOPEDIC SURGERY | Facility: CLINIC | Age: 68
End: 2023-10-13

## 2023-10-13 DIAGNOSIS — M65.332 TRIGGER FINGER, LEFT MIDDLE FINGER: ICD-10-CM

## 2023-10-13 DIAGNOSIS — M65.331 TRIGGER FINGER, RIGHT MIDDLE FINGER: ICD-10-CM

## 2023-10-13 DIAGNOSIS — M65.322 TRIGGER FINGER, LEFT INDEX FINGER: ICD-10-CM

## 2023-10-13 DIAGNOSIS — G56.02 CARPAL TUNNEL SYNDROME, LEFT UPPER LIMB: ICD-10-CM

## 2023-11-28 ENCOUNTER — APPOINTMENT (OUTPATIENT)
Dept: ORTHOPEDIC SURGERY | Facility: CLINIC | Age: 68
End: 2023-11-28

## 2024-11-13 ENCOUNTER — APPOINTMENT (OUTPATIENT)
Dept: ORTHOPEDIC SURGERY | Facility: CLINIC | Age: 69
End: 2024-11-13
Payer: MEDICARE

## 2024-11-13 DIAGNOSIS — M65.321 TRIGGER FINGER, RIGHT INDEX FINGER: ICD-10-CM

## 2024-11-13 DIAGNOSIS — M79.642 PAIN IN RIGHT HAND: ICD-10-CM

## 2024-11-13 DIAGNOSIS — M65.322 TRIGGER FINGER, LEFT INDEX FINGER: ICD-10-CM

## 2024-11-13 DIAGNOSIS — M65.332 TRIGGER FINGER, LEFT MIDDLE FINGER: ICD-10-CM

## 2024-11-13 DIAGNOSIS — G56.01 CARPAL TUNNEL SYNDROME, RIGHT UPPER LIMB: ICD-10-CM

## 2024-11-13 DIAGNOSIS — G56.02 CARPAL TUNNEL SYNDROME, LEFT UPPER LIMB: ICD-10-CM

## 2024-11-13 DIAGNOSIS — M65.331 TRIGGER FINGER, RIGHT MIDDLE FINGER: ICD-10-CM

## 2024-11-13 DIAGNOSIS — M79.641 PAIN IN RIGHT HAND: ICD-10-CM

## 2024-11-13 PROCEDURE — 73130 X-RAY EXAM OF HAND: CPT | Mod: RT

## 2024-11-13 PROCEDURE — 20550 NJX 1 TENDON SHEATH/LIGAMENT: CPT | Mod: RT

## 2024-11-13 PROCEDURE — 99214 OFFICE O/P EST MOD 30 MIN: CPT | Mod: 25

## 2024-11-13 RX ORDER — BETAMETHA AC,SOD PHOS/WATER/PF 6 MG/ML
6 (3-3) VIAL (ML) INJECTION
Qty: 2 | Refills: 0 | Status: COMPLETED | OUTPATIENT
Start: 2024-11-13

## 2024-11-13 RX ORDER — LIDOCAINE HYDROCHLORIDE 10 MG/ML
1 INJECTION, SOLUTION INFILTRATION; PERINEURAL
Refills: 0 | Status: COMPLETED | OUTPATIENT
Start: 2024-11-13

## 2024-11-13 RX ADMIN — BETAMETHASONE ACETATE AND BETAMETHASONE SODIUM PHOSPHATE 1 MG/ML: 3; 3 INJECTION, SUSPENSION INTRA-ARTICULAR; INTRALESIONAL; INTRAMUSCULAR; SOFT TISSUE at 00:00

## 2024-11-13 RX ADMIN — LIDOCAINE HYDROCHLORIDE 0.5 %: 10 INJECTION, SOLUTION INFILTRATION; PERINEURAL at 00:00

## 2025-02-06 ENCOUNTER — NON-APPOINTMENT (OUTPATIENT)
Age: 70
End: 2025-02-06

## 2025-02-20 ENCOUNTER — APPOINTMENT (OUTPATIENT)
Dept: INFECTIOUS DISEASE | Facility: CLINIC | Age: 70
End: 2025-02-20
Payer: MEDICARE

## 2025-02-20 VITALS
TEMPERATURE: 98 F | DIASTOLIC BLOOD PRESSURE: 75 MMHG | SYSTOLIC BLOOD PRESSURE: 125 MMHG | OXYGEN SATURATION: 98 % | BODY MASS INDEX: 28.28 KG/M2 | WEIGHT: 176 LBS | HEART RATE: 66 BPM | HEIGHT: 66 IN

## 2025-02-20 DIAGNOSIS — D72.10 EOSINOPHILIA, UNSPECIFIED: ICD-10-CM

## 2025-02-20 DIAGNOSIS — Z86.39 PERSONAL HISTORY OF OTHER ENDOCRINE, NUTRITIONAL AND METABOLIC DISEASE: ICD-10-CM

## 2025-02-20 DIAGNOSIS — L29.9 PRURITUS, UNSPECIFIED: ICD-10-CM

## 2025-02-20 PROCEDURE — 99204 OFFICE O/P NEW MOD 45 MIN: CPT

## 2025-02-21 ENCOUNTER — LABORATORY RESULT (OUTPATIENT)
Age: 70
End: 2025-02-21

## 2025-05-21 ENCOUNTER — APPOINTMENT (OUTPATIENT)
Dept: ORTHOPEDIC SURGERY | Facility: CLINIC | Age: 70
End: 2025-05-21
Payer: MEDICARE

## 2025-05-21 DIAGNOSIS — G56.01 CARPAL TUNNEL SYNDROME, RIGHT UPPER LIMB: ICD-10-CM

## 2025-05-21 DIAGNOSIS — M65.321 TRIGGER FINGER, RIGHT INDEX FINGER: ICD-10-CM

## 2025-05-21 DIAGNOSIS — M65.331 TRIGGER FINGER, RIGHT MIDDLE FINGER: ICD-10-CM

## 2025-05-21 DIAGNOSIS — M77.11 LATERAL EPICONDYLITIS, RIGHT ELBOW: ICD-10-CM

## 2025-05-21 PROCEDURE — 99214 OFFICE O/P EST MOD 30 MIN: CPT

## 2025-05-21 PROCEDURE — 73080 X-RAY EXAM OF ELBOW: CPT | Mod: RT

## (undated) DEVICE — SOL IRR POUR H2O 1500ML

## (undated) DEVICE — POSITIONER FOAM HEAD CRADLE (PINK)

## (undated) DEVICE — DRAPE TOWEL BLUE 17" X 24"

## (undated) DEVICE — PACK UPPER EXTREMITY

## (undated) DEVICE — WARMING BLANKET FULL ADULT

## (undated) DEVICE — SUT MONOCRYL 5-0 18" P-3 UNDYED

## (undated) DEVICE — DRSG KLING 3"

## (undated) DEVICE — DRAPE 3/4 SHEET 52X76"

## (undated) DEVICE — DRSG WEBRIL 3"

## (undated) DEVICE — GLV 7.5 PROTEXIS (BLUE)

## (undated) DEVICE — DRSG STERISTRIPS 0.5 X 4"

## (undated) DEVICE — TOURNIQUET ESMARK 4"

## (undated) DEVICE — DRSG KLING 4"

## (undated) DEVICE — BLADE CARPAL TUNNEL SNGL

## (undated) DEVICE — VENODYNE/SCD SLEEVE CALF MEDIUM

## (undated) DEVICE — SOL IRR POUR NS 0.9% 500ML

## (undated) DEVICE — SUT MONOSOF 5-0 18" P-12

## (undated) DEVICE — DRSG XEROFORM 1 X 8"

## (undated) DEVICE — GLV 7 PROTEXIS (WHITE)

## (undated) DEVICE — SLING ARM CHIEFTAIN MESH LARGE

## (undated) DEVICE — NDL HYPO REGULAR BEVEL 25G X 1.5" (BLUE)

## (undated) DEVICE — TOURNIQUET CUFF 18" DUAL PORT SINGLE BLADDER W PLC  (BLACK)

## (undated) DEVICE — SLING ARM CHIEFTAIN MESH MEDIUM

## (undated) DEVICE — SPECIMEN CONTAINER PET

## (undated) DEVICE — WARMING BLANKET LOWER ADULT

## (undated) DEVICE — POSITIONER STRAP ARMBOARD VELCRO TS-30